# Patient Record
Sex: FEMALE | Race: WHITE
[De-identification: names, ages, dates, MRNs, and addresses within clinical notes are randomized per-mention and may not be internally consistent; named-entity substitution may affect disease eponyms.]

---

## 2022-08-10 ENCOUNTER — HOSPITAL ENCOUNTER (OUTPATIENT)
Dept: HOSPITAL 47 - RADMRIMAIN | Age: 52
Discharge: HOME | End: 2022-08-10
Attending: FAMILY MEDICINE
Payer: COMMERCIAL

## 2022-08-10 DIAGNOSIS — M47.816: Primary | ICD-10-CM

## 2022-08-10 DIAGNOSIS — M51.26: ICD-10-CM

## 2022-08-10 PROCEDURE — 72148 MRI LUMBAR SPINE W/O DYE: CPT

## 2022-08-11 NOTE — MR
EXAMINATION TYPE: MR lumbar spine wo con

 

DATE OF EXAM: 8/10/2022

 

COMPARISON: None

 

HISTORY: Low back pain that radiates down both legs

 

Multiplanar multi echo imaging of the lumbar spine with no contrast.

 

 

 

The lumbar vertebra have fairly normal alignment. There is mild narrowing of the disc spaces. Disc sp
ace narrowing more noticeable at L1-2. There is posterior mild disc herniation at L1-2 and L3-4. Ther
e is a minimal L5-S1 subluxation. No definite spondylolysis. No evidence of focal bone destruction. T
here is no lumbar paraspinal mass. No evidence of any significant lumbar spinal stenosis. The lumbar 
neural foramina appear fairly well maintained.

 

IMPRESSION:

Spondylotic changes. Mild posterior disc herniation at L1-2 and L3-4 without spinal stenosis. No frac
ture.

## 2022-11-15 ENCOUNTER — HOSPITAL ENCOUNTER (OUTPATIENT)
Dept: HOSPITAL 47 - LABPAT | Age: 52
Discharge: HOME | End: 2022-11-15
Attending: ORTHOPAEDIC SURGERY
Payer: COMMERCIAL

## 2022-11-15 DIAGNOSIS — Z01.812: ICD-10-CM

## 2022-11-15 DIAGNOSIS — Z01.818: Primary | ICD-10-CM

## 2022-11-15 DIAGNOSIS — M16.12: ICD-10-CM

## 2022-11-15 LAB
ALBUMIN SERPL-MCNC: 4.7 G/DL (ref 3.8–4.9)
ALBUMIN/GLOB SERPL: 1.69 G/DL (ref 1.6–3.17)
ALP SERPL-CCNC: 117 U/L (ref 41–126)
ALT SERPL-CCNC: 12 U/L (ref 8–44)
ANION GAP SERPL CALC-SCNC: 16.1 MMOL/L (ref 10–18)
APTT BLD: 26 SEC (ref 22–30)
AST SERPL-CCNC: 29 U/L (ref 13–35)
BASOPHILS # BLD AUTO: 0.08 X 10*3/UL (ref 0–0.1)
BASOPHILS NFR BLD AUTO: 0.8 %
BUN SERPL-SCNC: 11 MG/DL (ref 9–27)
BUN/CREAT SERPL: 14.38 RATIO (ref 12–20)
CALCIUM SPEC-MCNC: 10.1 MG/DL (ref 8.7–10.3)
CHLORIDE SERPL-SCNC: 105 MMOL/L (ref 96–109)
CO2 SERPL-SCNC: 22.3 MMOL/L (ref 20–27.5)
EOSINOPHIL # BLD AUTO: 0.23 X 10*3/UL (ref 0.04–0.35)
EOSINOPHIL NFR BLD AUTO: 2.4 %
ERYTHROCYTE [DISTWIDTH] IN BLOOD BY AUTOMATED COUNT: 5.79 X 10*6/UL (ref 4.1–5.2)
ERYTHROCYTE [DISTWIDTH] IN BLOOD: 16.8 % (ref 11.5–14.5)
GLOBULIN SER CALC-MCNC: 2.8 G/DL (ref 1.6–3.3)
GLUCOSE SERPL-MCNC: 75 MG/DL (ref 70–110)
HCT VFR BLD AUTO: 45.9 % (ref 37.2–46.3)
HGB BLD-MCNC: 13.6 G/DL (ref 12–15)
IMM GRANULOCYTES BLD QL AUTO: 0.3 %
INR PPP: 1.1 (ref ?–1.2)
LYMPHOCYTES # SPEC AUTO: 2.55 X 10*3/UL (ref 0.9–5)
LYMPHOCYTES NFR SPEC AUTO: 27 %
MCH RBC QN AUTO: 23.5 PG (ref 27–32)
MCHC RBC AUTO-ENTMCNC: 29.6 G/DL (ref 32–37)
MCV RBC AUTO: 79.3 FL (ref 80–97)
MONOCYTES # BLD AUTO: 0.57 X 10*3/UL (ref 0.2–1)
MONOCYTES NFR BLD AUTO: 6 %
NEUTROPHILS # BLD AUTO: 5.97 X 10*3/UL (ref 1.8–7.7)
NEUTROPHILS NFR BLD AUTO: 63.5 %
NRBC BLD AUTO-RTO: 0 /100 WBCS (ref 0–0)
PH UR: 7 [PH] (ref 5–8)
PLATELET # BLD AUTO: 332 X 10*3/UL (ref 140–440)
POTASSIUM SERPL-SCNC: 4.2 MMOL/L (ref 3.5–5.5)
PROT SERPL-MCNC: 7.5 G/DL (ref 6.2–8.2)
PT BLD: 11.4 SEC (ref 9–12)
RBC UR QL: 1 /HPF (ref 0–5)
SODIUM SERPL-SCNC: 144 MMOL/L (ref 135–145)
SP GR UR: 1.02 (ref 1–1.03)
SQUAMOUS UR QL AUTO: 4 /HPF (ref 0–4)
UROBILINOGEN UR QL STRIP: <2 MG/DL (ref ?–2)
WBC # BLD AUTO: 9.43 X 10*3/UL (ref 4.5–10)
WBC #/AREA URNS HPF: 23 /HPF (ref 0–5)

## 2022-11-15 PROCEDURE — 85610 PROTHROMBIN TIME: CPT

## 2022-11-15 PROCEDURE — 93005 ELECTROCARDIOGRAM TRACING: CPT

## 2022-11-15 PROCEDURE — 85730 THROMBOPLASTIN TIME PARTIAL: CPT

## 2022-11-15 PROCEDURE — 87070 CULTURE OTHR SPECIMN AEROBIC: CPT

## 2022-11-15 PROCEDURE — 80053 COMPREHEN METABOLIC PANEL: CPT

## 2022-11-15 PROCEDURE — 85025 COMPLETE CBC W/AUTO DIFF WBC: CPT

## 2022-11-15 PROCEDURE — 81001 URINALYSIS AUTO W/SCOPE: CPT

## 2022-11-21 ENCOUNTER — HOSPITAL ENCOUNTER (OUTPATIENT)
Dept: HOSPITAL 47 - OR | Age: 52
LOS: 1 days | Discharge: HOME HEALTH | End: 2022-11-22
Attending: ORTHOPAEDIC SURGERY
Payer: COMMERCIAL

## 2022-11-21 VITALS — BODY MASS INDEX: 40.7 KG/M2

## 2022-11-21 DIAGNOSIS — Z79.899: ICD-10-CM

## 2022-11-21 DIAGNOSIS — F17.210: ICD-10-CM

## 2022-11-21 DIAGNOSIS — M16.12: Primary | ICD-10-CM

## 2022-11-21 DIAGNOSIS — F41.8: ICD-10-CM

## 2022-11-21 DIAGNOSIS — E66.9: ICD-10-CM

## 2022-11-21 DIAGNOSIS — Z88.0: ICD-10-CM

## 2022-11-21 DIAGNOSIS — Z79.1: ICD-10-CM

## 2022-11-21 DIAGNOSIS — Z79.82: ICD-10-CM

## 2022-11-21 LAB — GLUCOSE BLD-MCNC: 106 MG/DL (ref 70–110)

## 2022-11-21 PROCEDURE — 85025 COMPLETE CBC W/AUTO DIFF WBC: CPT

## 2022-11-21 PROCEDURE — 86900 BLOOD TYPING SEROLOGIC ABO: CPT

## 2022-11-21 PROCEDURE — 86901 BLOOD TYPING SEROLOGIC RH(D): CPT

## 2022-11-21 PROCEDURE — 27130 TOTAL HIP ARTHROPLASTY: CPT

## 2022-11-21 PROCEDURE — 73501 X-RAY EXAM HIP UNI 1 VIEW: CPT

## 2022-11-21 PROCEDURE — 97161 PT EVAL LOW COMPLEX 20 MIN: CPT

## 2022-11-21 PROCEDURE — 88311 DECALCIFY TISSUE: CPT

## 2022-11-21 PROCEDURE — 86850 RBC ANTIBODY SCREEN: CPT

## 2022-11-21 PROCEDURE — 88305 TISSUE EXAM BY PATHOLOGIST: CPT

## 2022-11-21 RX ADMIN — CEFAZOLIN SCH: 330 INJECTION, POWDER, FOR SOLUTION INTRAMUSCULAR; INTRAVENOUS at 10:21

## 2022-11-21 RX ADMIN — HYDROCODONE BITARTRATE AND ACETAMINOPHEN PRN EACH: 7.5; 325 TABLET ORAL at 23:46

## 2022-11-21 RX ADMIN — POTASSIUM CHLORIDE SCH MLS: 14.9 INJECTION, SOLUTION INTRAVENOUS at 06:30

## 2022-11-21 RX ADMIN — HYDROCODONE BITARTRATE AND ACETAMINOPHEN PRN EACH: 7.5; 325 TABLET ORAL at 11:25

## 2022-11-21 RX ADMIN — ASPIRIN 325 MG ORAL TABLET SCH MG: 325 PILL ORAL at 20:27

## 2022-11-21 RX ADMIN — HYDROCODONE BITARTRATE AND ACETAMINOPHEN PRN EACH: 7.5; 325 TABLET ORAL at 16:55

## 2022-11-21 RX ADMIN — NICOTINE SCH PATCH: 21 PATCH, EXTENDED RELEASE TRANSDERMAL at 14:58

## 2022-11-21 NOTE — P.OP
Date of Procedure: 11/21/22


Preoperative Diagnosis: 


severe osteoarthritis left hip


Postoperative Diagnosis: 


1.  Severe osteoarthritis left hip


2.  Obesity


Procedure(s) Performed: 


1.  Left total hip arthroplasty with a direct anterior approach


2.  Application of Prevena wound vac sysytem 


Implants: 


[employer] Polarstem standard size 3 


[employer] R3, 3 hole hemispherical acetabular shell, 54 mm


[employer] Nephew Reflection 6.5 mm cancellus screw, 20 mm 2


[employer] R3, XLPE 20 acetabular liner 


[employer] Nephew Oxinium femoral head 36 m, +4


All components were press-fit.


The articulation is Oxinium on polyethylene.


Anesthesia: spinal


Surgeon: Sam Hatfield


Assistant #1: Carol Fonseca


Estimated Blood Loss (ml): 400


Pathology: other (femoral head)


Condition: stable


Disposition: PACU


Indications for Procedure: 


After failure of conservative treatment we discussed the surgical and 

nonsurgical treatment options at length.  Patient wishes to proceed with a total

hip arthroplasty with a direct anterior approach.  Complications specific to 

this procedure were discussed at length, including but not limited to infection,

leg length discrepancy, dislocation, nerve injury, and fracture.  Covid-19 was 

also discussed at length with the patient, and they are aware of the current 

policies and procedures.  The patient was given the option of delaying surgery, 

but they elect to proceed knowing these risks.  Patient is aware of all these 

complications and informed consent was obtained


Operative Findings: 


the operative findings are consistent with severe osteoarthritis of the left hip


Description of Procedure: 


Patient was seen and evaluated in the preoperative area and the consent was 

reviewed.  The operative site was marked with a skin marker.  The patient was 

then brought to the operating room and given preoperative antibiotics 

intravenously.  1 g of Tranexamic acid was also given intravenously.  A spinal 

anesthetic was administered by the anesthesia department.   The patient was then

placed on the Laclede table with the bony prominences well-padded.  The hip area 

was then prepped with a ChloraPrep solution and draped in the usual sterile 

fashion.





A universal timeout was then performed, which confirmed the patient's name, 

surgical site, ALLERGIES, and procedure being performed on the consent.  Next 

the incision site was located at 1 cm distal and 2 cm lateral to the anterior 

superior iliac spine.  The skin and subcutaneous tissues were sharply incised.  

Incision was carefully dissected down to the fascia overlying the tensor fascia 

lilia muscle.  This fascia was then incised in line with the incision.  Care was 

taken to stay laterally in order to avoid injuring the lateral femoral cutaneous

nerve.  Next, using blunt finger dissection, the tensor fascia lilia muscle was 

dissected off its investing fascia.  The muscle was then carefully retracted 

laterally with a cobra retractor over the lateral neck of the femur.  Next, the 

circumflex vessels were identified and cauterized using the AquaMantis device.  

The anterior hip capsule was then exposed.  The capsule was then opened and an 

inverted T fashion.  Cobra retractors were then placed intracapsularly.  The 

retractors were maintained intracapsular throughout the procedure.  The proximal

femur was then visualized.  Fluoroscopic x-rays were then taken in order to 

evaluate the preoperative leg lengths.  A small amount of traction was placed on

the leg.





The femoral neck was then osteotomized at the appropriate level above the lesser

trochanter.  A small wedge of bone was then removed from the remaining femoral 

head.  Next, using a corkscrew the femoral head was removed from the acetabulum.

 On gross visual inspection, the femoral head had complete loss of articular 

cartilage and multiple periarticular osteophytes.  The femoral head was then 

measured.  Attention was then turned to the acetabulum.





The acetabulum was exposed and any remaining labrum was excised.  Sequential 

reaming of the acetabulum was performed using fluoroscopic guidance until there 

was a good bed of bleeding cancellus bone.  When the appropriate size was 

reached, a trial was then placed.  The position and fit of the trial was checked

with fluoroscopy.  The trial was then removed.  Then, using fluoroscopic 

guidance, the final implant was impacted at 20 of anteversion and 40 of 

abduction, and fully seated in the acetabulum.  2 screws were then placed in the

acetabulum.  Again fluoroscopy was used to check position of the screws.  Next, 

the liner was then impacted, with a 20 elevated liner located in the anterior 

superior quadrant.  Component locking was confirmed.





Attention was then directed to the femur.  With the aid of the Laclede table, the 

femur was externally rotated to approximately 130, extended, and adducted under

the opposite leg.  A side hook was then placed under the proximal femur, and the

side hook elevator was used to elevate the proximal femur while releasing the 

capsule.  Retractors were then placed.  A capsular release was performed, as 

well as a release of the conjoined tendon, which afforded excellent 

visualization of the proximal femur.  Next, a box osteotome was used to 

lateralize the proximal femur.  A  was then used to locate the 

femoral canal.  Sequential broaching was then performed with appropriate size 

which afforded excellent fixation in the proximal femur.  A trial was then 

placed with appropriate head and neck, and the hip was gently reduced with the 

aid of the Laclede table.  Fluoroscopy was then used to check position of the 

components, as well as to evaluate the leg lengths and offset.  The leg lengths 

and offset were measured as closely as possible to ensure stability of the hip. 

The hip was then gently dislocated and the trials were then removed.  Final 

implants were then impacted and the hip was again reduced.  Final fluoroscopic 

x-rays confirmed that the components were in anatomic position.  The leg lengths

and offset were measured and were found to coincide with the trial measurements.

 The hip was also taken through range of motion, and found to be stable.





The hip was then copiously irrigated with antibiotic solution with pulsatile 

lavage.  The hip was then irrigated with Irrisept solution.  The soft tissues 

were then injected with a ropivacaine solution.  A second dose of 1 g of 

Tranexamic acid was also given intravenously. 





The fascia was then closed with 2-0 strata fix suture.  The subcutaneous tissue 

was closed with 3-0 Vicryl.  The subcuticular tissue was closed with 3-0 strata 

fix suture.  A Prevena wound vac dressing was applied secondary to the patients 

large panus covering the incision site.  The patient was then transferred to the

recovery room in stable condition.





The assistant  CHELLY Woodard was required due to the complexity of surgery, 

and the need for skilled surgical assistant for positioning, draping, exposure, 

retraction, and closure of the wound.

## 2022-11-21 NOTE — FL
Intraoperative/procedural fluoroscopic services were provided. Total fluoroscopy time is 43 seconds w
ith a total of 3 submitted images to PACS. Please see the operative/procedural note for further detai
ls.

## 2022-11-21 NOTE — P.CONS
History of Present Illness





- Reason for Consult


Consult date: 11/21/22


Medical management


Requesting physician: Sam Hatfield





- Chief Complaint


Left hip surgery





- History of Present Illness





This is a pleasant 52-year-old patient who follows with Dr. Narayan Wade.  

Chronic stable medical conditions include GERD, anxiety, depression, nicotine 

dependence, osteoarthritis.  Patient has undergone left total hip arthroplasty. 

Postprocedure pain is controlled.  No nausea vomiting.  Did tolerate some diet. 

No chest pain or shortness of breath.





Review of systems:


GEN.:  Tired


EYES: None


HEENT: None


NECK: None


RESPIRATORY: None


CARDIOVASCULAR: None


GASTROINTESTINAL: None


GENITOURINARY: None


MUSCULOSKELETAL: Joint pains


LYMPHATICS: None


HEMATOLOGICAL: None  


PSYCHIATRY: None


NEUROLOGICAL: None





Past medical history to include:


GERD, anxiety, depression, nicotine dependence





Social history:


Lives with her .  Smokes about half a pack a day for last 34 years.  No 

alcohol.





Family history:


Reviewed, noncontributory to presentation





Physical examination:


VITAL SIGNS: 97.7, 92, 17, 1 25 x 65, 97% room air


GENERAL: BMI 40.8, declining bed, awake, comfortable.


EYES: Pupils equal.  Conjunctiva normal.


HEENT: External appearance of nose and ears normal, oral cavity grossly normal.


NECK: JVD not raised; masses not palpable.


HEART: First and second heart sounds are normal;  no edema.  


LUNGS: Respiratory rate normal; clear to auscultation.  


ABDOMEN: Soft,  nontender, liver spleen not palpable, no masses palpable.  


PSYCH: Alert and oriented x3;  mood  and affect normal.  


MUSCULOSKELETAL:No Clubbing/cyanosis;muscles-grossly intact, evidence of OA, 

dressing on the left hip


NEUROLOGICAL: Cranial nerves grossly intact; no facial asymmetry,   power and 

sensation grossly intact. 


LYMPHATICS: No lymph nodes palpable in the axilla and neck





INVESTIGATIONS, reviewed in the clinical context:


Lab work from 11/15/2022


WBC 9.4 hemoglobin 13.6 platelets 332 potassium 4.2 creatinine 0.8 AST 29 ALT 12





Assessment and plan:





-Left total hip arthroplasty.


Pain control.  Aspirin for DVT prophylaxis.  Ancef for infection prophylaxis





-Morbid obesity BMI 40.8


Consult dietitian for weight loss measures





-Depression and anxiety


Lexapro





-Chronic nicotine dependence, suggested smoker


Nicotine patch





Care was discussed with the patient.  Questions answered.  Nicotine patch.  

Aspirin for DVT prophylaxis.  Patient to follow-up with his PCP upon discharge


Thank you Dr. Hatfield





Past Medical History


Past Medical History: Osteoarthritis (OA)


Additional Past Medical History / Comment(s): US on 11-14-22 @ [facility] of left leg no blood clots after having injury-no open 

areas,periodic steroid use-last used Nov 2022, Ant TLH 11/21/2022


History of Any Multi-Drug Resistant Organisms: None Reported


Additional Past Surgical History / Comment(s): wisdom teeth


Past Anesthesia/Blood Transfusion Reactions: Motion Sickness, Postoperative 

Nausea & Vomiting (PONV)


Past Psychological History: Anxiety, Depression


Smoking Status: Current every day smoker


Past Alcohol Use History: None Reported


Additional Past Alcohol Use History / Comment(s): started smoking at age 

18,<1ppd


Past Drug Use History: None Reported





- Past Family History


  ** Mother


Family Medical History: No Reported History





Medications and Allergies


                                Home Medications











 Medication  Instructions  Recorded  Confirmed  Type


 


ALPRAZolam [Xanax] 0.25 mg PO BID PRN 11/16/22 11/16/22 History


 


Escitalopram Oxalate [Lexapro] 10 mg PO HS 11/16/22 11/16/22 History


 


HYDROcodone/APAP 5-325MG [Norco 1 tab PO Q6HR PRN 11/16/22 11/16/22 History





5-325]    


 


Ibuprofen [Motrin] 800 mg PO Q8H PRN 11/16/22 11/16/22 History


 


methylPREDNISolone Dose Pack 4 mg PO AS DIRECTED PRN 11/16/22 11/16/22 History





[Medrol Dose Pack]    


 


Aspirin 325 mg PO BID #60 tab 11/21/22  Rx


 


HYDROcodone/APAP 7.5-325MG [Norco 1 - 2 tab PO Q6H PRN #32 tab 11/21/22  Rx





7.5-325]    


 


Sennosides [Senokot] 2 tab PO DAILY PRN #60 tablet 11/21/22  Rx








                                    Allergies











Allergy/AdvReac Type Severity Reaction Status Date / Time


 


Penicillins Allergy  Rash/Hives Verified 11/16/22 15:08














Physical Exam


Vitals: 


                                   Vital Signs











  Temp Pulse Resp BP Pulse Ox


 


 11/21/22 20:00  97.7 F  92  17  125/65  97


 


 11/21/22 14:00  98.2 F  105 H  18  106/70  94 L


 


 11/21/22 11:15  98.0 F  70  18  104/66  96


 


 11/21/22 10:47   62  16  107/56  95


 


 11/21/22 10:32   64  16  111/61  94 L


 


 11/21/22 10:17   69  16  109/62  95


 


 11/21/22 10:02   64  16  109/60  95


 


 11/21/22 09:47   67  16  116/62  94 L


 


 11/21/22 09:32   63  16  110/58  94 L


 


 11/21/22 09:17   68  16  105/58  96


 


 11/21/22 09:02   68  16  106/62  96


 


 11/21/22 08:47  97.0 F L  78  12  113/63  99


 


 11/21/22 06:46  97 F L  69  20  132/72  98








                                Intake and Output











 11/21/22 11/21/22 11/21/22





 06:59 14:59 22:59


 


Intake Total 100 1251 


 


Output Total  600 


 


Balance 100 651 


 


Intake:   


 


   1251 


 


Output:   


 


  Urine  200 


 


  Estimated Blood Loss  400 


 


Other:   


 


  Weight 101.1 kg 101.1 kg

## 2022-11-21 NOTE — XR
EXAMINATION TYPE: XR Hip Limited LT

 

DATE OF EXAM: 11/21/2022

 

COMPARISON: NONE

 

HISTORY: Postop

 

TECHNIQUE: One view submitted.

 

FINDINGS:

There is postsurgical change in near anatomic alignment.  There is soft tissue edema and emphysema. 

 

IMPRESSION:

1. Postoperative change.  Appears in near-anatomic alignment.

## 2022-11-22 VITALS
SYSTOLIC BLOOD PRESSURE: 145 MMHG | HEART RATE: 64 BPM | RESPIRATION RATE: 18 BRPM | DIASTOLIC BLOOD PRESSURE: 75 MMHG | TEMPERATURE: 97.9 F

## 2022-11-22 LAB
BASOPHILS # BLD AUTO: 0.04 X 10*3/UL (ref 0–0.1)
BASOPHILS NFR BLD AUTO: 0.4 %
EOSINOPHIL # BLD AUTO: 0.09 X 10*3/UL (ref 0.04–0.35)
EOSINOPHIL NFR BLD AUTO: 0.8 %
ERYTHROCYTE [DISTWIDTH] IN BLOOD BY AUTOMATED COUNT: 3.9 X 10*6/UL (ref 4.1–5.2)
ERYTHROCYTE [DISTWIDTH] IN BLOOD: 17.1 % (ref 11.5–14.5)
HCT VFR BLD AUTO: 30.7 % (ref 37.2–46.3)
HGB BLD-MCNC: 9 G/DL (ref 12–15)
IMM GRANULOCYTES BLD QL AUTO: 0.4 %
LYMPHOCYTES # SPEC AUTO: 1.86 X 10*3/UL (ref 0.9–5)
LYMPHOCYTES NFR SPEC AUTO: 17.5 %
MCH RBC QN AUTO: 23.1 PG (ref 27–32)
MCHC RBC AUTO-ENTMCNC: 29.3 G/DL (ref 32–37)
MCV RBC AUTO: 78.7 FL (ref 80–97)
MONOCYTES # BLD AUTO: 1.05 X 10*3/UL (ref 0.2–1)
MONOCYTES NFR BLD AUTO: 9.9 %
NEUTROPHILS # BLD AUTO: 7.55 X 10*3/UL (ref 1.8–7.7)
NEUTROPHILS NFR BLD AUTO: 71 %
NRBC BLD AUTO-RTO: 0 /100 WBCS (ref 0–0)
PLATELET # BLD AUTO: 228 X 10*3/UL (ref 140–440)
WBC # BLD AUTO: 10.63 X 10*3/UL (ref 4.5–10)

## 2022-11-22 RX ADMIN — HYDROCODONE BITARTRATE AND ACETAMINOPHEN PRN EACH: 7.5; 325 TABLET ORAL at 06:59

## 2022-11-22 RX ADMIN — CEFAZOLIN SCH: 330 INJECTION, POWDER, FOR SOLUTION INTRAMUSCULAR; INTRAVENOUS at 04:26

## 2022-11-22 RX ADMIN — ASPIRIN 325 MG ORAL TABLET SCH MG: 325 PILL ORAL at 07:35

## 2022-11-22 RX ADMIN — HYDROCODONE BITARTRATE AND ACETAMINOPHEN PRN EACH: 7.5; 325 TABLET ORAL at 12:05

## 2022-11-22 RX ADMIN — NICOTINE SCH PATCH: 21 PATCH, EXTENDED RELEASE TRANSDERMAL at 07:35

## 2022-11-22 RX ADMIN — POTASSIUM CHLORIDE SCH: 14.9 INJECTION, SOLUTION INTRAVENOUS at 06:09

## 2022-11-22 NOTE — P.PN
Progress Note - Text


Progress Note Date: 11/22/22





- Chief Complaint


Left hip surgery








Hospital course


This is a pleasant 52-year-old patient who follows with Dr. Narayan Wade.  

Chronic stable medical conditions include GERD, anxiety, depression, nicotine 

dependence, osteoarthritis.  Patient has undergone left total hip arthroplasty. 

Postprocedure pain is controlled.  No nausea vomiting.  Did tolerate some diet. 

No chest pain or shortness of breath.


11/22/2022: Pain control.  Oral intake fair.  Up in a chair.  Did ambulate.  

Questions answered.





Current medications reviewed





Past medical history to include:


GERD, anxiety, depression, nicotine dependence





Social history:


Lives with her .  Smokes about half a pack a day for last 34 years.  No 

alcohol.





Family history:


Reviewed, noncontributory to presentation





Physical examination:


VITAL SIGNS: 97.9, 64, 18, 145% he 5, 99% room air


GENERAL: Up in a chair, comfortable


EYES: Pupils equal.  Conjunctiva normal.


HEENT: External appearance of nose and ears normal, oral cavity grossly normal.


NECK: JVD not raised; masses not palpable.


HEART: First and second heart sounds are normal;  no edema.  


LUNGS: Respiratory rate normal; clear to auscultation.  


ABDOMEN: Soft,  nontender, liver spleen not palpable, no masses palpable.  


PSYCH: Alert and oriented x3;  mood  and affect normal.  


MUSCULOSKELETAL:No Clubbing/cyanosis;muscles-grossly intact, evidence of OA, 

dressing on the left hip








INVESTIGATIONS, reviewed in the clinical context:


11/22/2022: White count 10.6 hemoglobin 9 platelets 228


Lab work from 11/15/2022


WBC 9.4 hemoglobin 13.6 platelets 332 potassium 4.2 creatinine 0.8 AST 29 ALT 12





Assessment and plan:





-Left total hip arthroplasty.


Pain control.  Aspirin for DVT prophylaxis.  Ancef for infection prophylaxis





-Acute postprocedure blood loss anemia expected from surgery


Add ferrous sulfate





-Morbid obesity BMI 40.8


Consult dietitian for weight loss measures





-Depression and anxiety


Lexapro





-Chronic nicotine dependence, suggested smoker


Nicotine patch





Ferrous sulfate added.  Patient to follow-up with PCP upon discharge.  

Discussed.


Thank you Dr. Hatfield

## 2023-03-21 ENCOUNTER — HOSPITAL ENCOUNTER (OUTPATIENT)
Dept: HOSPITAL 47 - OR | Age: 53
Setting detail: OBSERVATION
LOS: 2 days | Discharge: HOME HEALTH | End: 2023-03-23
Attending: ORTHOPAEDIC SURGERY | Admitting: ORTHOPAEDIC SURGERY
Payer: COMMERCIAL

## 2023-03-21 VITALS — BODY MASS INDEX: 40.2 KG/M2

## 2023-03-21 DIAGNOSIS — E66.9: ICD-10-CM

## 2023-03-21 DIAGNOSIS — Z82.49: ICD-10-CM

## 2023-03-21 DIAGNOSIS — M16.11: Primary | ICD-10-CM

## 2023-03-21 DIAGNOSIS — G47.00: ICD-10-CM

## 2023-03-21 DIAGNOSIS — F32.A: ICD-10-CM

## 2023-03-21 DIAGNOSIS — Z79.891: ICD-10-CM

## 2023-03-21 DIAGNOSIS — Z96.642: ICD-10-CM

## 2023-03-21 DIAGNOSIS — Z79.1: ICD-10-CM

## 2023-03-21 DIAGNOSIS — Z88.0: ICD-10-CM

## 2023-03-21 DIAGNOSIS — Z87.891: ICD-10-CM

## 2023-03-21 DIAGNOSIS — Z79.899: ICD-10-CM

## 2023-03-21 DIAGNOSIS — Z83.3: ICD-10-CM

## 2023-03-21 DIAGNOSIS — F41.9: ICD-10-CM

## 2023-03-21 PROCEDURE — 86900 BLOOD TYPING SEROLOGIC ABO: CPT

## 2023-03-21 PROCEDURE — 86850 RBC ANTIBODY SCREEN: CPT

## 2023-03-21 PROCEDURE — 86901 BLOOD TYPING SEROLOGIC RH(D): CPT

## 2023-03-21 PROCEDURE — 85025 COMPLETE CBC W/AUTO DIFF WBC: CPT

## 2023-03-21 PROCEDURE — 97166 OT EVAL MOD COMPLEX 45 MIN: CPT

## 2023-03-21 PROCEDURE — 27130 TOTAL HIP ARTHROPLASTY: CPT

## 2023-03-21 PROCEDURE — 97116 GAIT TRAINING THERAPY: CPT

## 2023-03-21 PROCEDURE — 88300 SURGICAL PATH GROSS: CPT

## 2023-03-21 PROCEDURE — 81025 URINE PREGNANCY TEST: CPT

## 2023-03-21 PROCEDURE — 73501 X-RAY EXAM HIP UNI 1 VIEW: CPT

## 2023-03-21 PROCEDURE — 97161 PT EVAL LOW COMPLEX 20 MIN: CPT

## 2023-03-21 PROCEDURE — 97530 THERAPEUTIC ACTIVITIES: CPT

## 2023-03-21 RX ADMIN — DOCUSATE SODIUM AND SENNOSIDES SCH EACH: 50; 8.6 TABLET ORAL at 20:06

## 2023-03-21 RX ADMIN — ASPIRIN 325 MG ORAL TABLET SCH MG: 325 PILL ORAL at 20:06

## 2023-03-21 RX ADMIN — POTASSIUM CHLORIDE SCH MLS: 14.9 INJECTION, SOLUTION INTRAVENOUS at 05:50

## 2023-03-21 RX ADMIN — HYDROMORPHONE HYDROCHLORIDE PRN MG: 1 INJECTION, SOLUTION INTRAMUSCULAR; INTRAVENOUS; SUBCUTANEOUS at 09:43

## 2023-03-21 RX ADMIN — HYDROMORPHONE HYDROCHLORIDE PRN MG: 1 INJECTION, SOLUTION INTRAMUSCULAR; INTRAVENOUS; SUBCUTANEOUS at 23:30

## 2023-03-21 RX ADMIN — HYDROMORPHONE HYDROCHLORIDE PRN MG: 1 INJECTION, SOLUTION INTRAMUSCULAR; INTRAVENOUS; SUBCUTANEOUS at 12:22

## 2023-03-21 RX ADMIN — POTASSIUM CHLORIDE SCH: 14.9 INJECTION, SOLUTION INTRAVENOUS at 21:36

## 2023-03-21 RX ADMIN — CEFAZOLIN SCH MLS/HR: 330 INJECTION, POWDER, FOR SOLUTION INTRAMUSCULAR; INTRAVENOUS at 23:26

## 2023-03-21 RX ADMIN — HYDROMORPHONE HYDROCHLORIDE PRN MG: 1 INJECTION, SOLUTION INTRAMUSCULAR; INTRAVENOUS; SUBCUTANEOUS at 10:03

## 2023-03-21 RX ADMIN — HYDROMORPHONE HYDROCHLORIDE PRN MG: 1 INJECTION, SOLUTION INTRAMUSCULAR; INTRAVENOUS; SUBCUTANEOUS at 20:04

## 2023-03-21 RX ADMIN — CEFAZOLIN SCH: 330 INJECTION, POWDER, FOR SOLUTION INTRAMUSCULAR; INTRAVENOUS at 13:38

## 2023-03-21 NOTE — XR
EXAMINATION TYPE: XR Hip Limited RT

 

DATE OF EXAM: 3/21/2023 10:23 AM

 

INDICATION: 

Patient age:Female;  52 years old; 

Reason for study: Status post hip surgery, assess surgical alignment; PHH. 

 

COMPARISON: Fluoroscopic images of the right hip from the same date.

 

TECHNIQUE: The right hip was examined in frontal view.

 

FINDINGS: Postsurgical changes from right total hip arthroplasty. Hardware appears intact with approp
riate alignment. There is associated subcutaneous gas and edema. No acute fracture or dislocation.

 

IMPRESSION: 

Postsurgical changes from right total hip arthroplasty. Hardware appears intact with appropriate alig
nment.

## 2023-03-21 NOTE — P.OP
Date of Procedure: 03/21/23


Preoperative Diagnosis: 


Severe osteoarthritis right hip


Postoperative Diagnosis: 


Severe osteoarthritis right hip


Procedure(s) Performed: 


Right total hip arthroplasty with a direct anterior approach


Implants: 


Smith & Nephew Polarstem standard size 4


Smith & Nephew R3, 3 hole hemispherical acetabular shell, 50 mm


Smith & Nephew Reflection 6.5 mm cancellus screw, 20 mm, 25 mm


Smith & Nephew R3, XLPE 20 acetabular liner 


Smith & Nephew Oxinium femoral head 36 m, +0


All components were press-fit.


The articulation is Oxinium on polyethylene.


Anesthesia: spinal


Surgeon: Sam Hatfield


Assistant #1: Carol Fonseca


Estimated Blood Loss (ml): 700


Pathology: other (Femoral head)


Condition: stable


Disposition: PACU


Indications for Procedure: 


After failure of conservative treatment we discussed the surgical and nonsu

rgical treatment options at length.  Patient wishes to proceed with a total hip 

arthroplasty with a direct anterior approach.  Complications specific to this 

procedure were discussed at length, including but not limited to infection, leg 

length discrepancy, dislocation, nerve injury, and fracture.  Covid-19 was also 

discussed at length with the patient, and they are aware of the current policies

and procedures.  The patient was given the option of delaying surgery, but they 

elect to proceed knowing these risks.  Patient is aware of all these 

complications and informed consent was obtained


Operative Findings: 


The operative findings are consistent with severe osteoarthritis of the right 

hip


Description of Procedure: 


The patient was seen and evaluated in the preoperative area and the consent was 

reviewed.  The operative site was marked with a skin marker.  The patient 

verified the procedure and operative site.  A PENG block was placed by 

anesthesia in the preoperative area. The patient was then brought to the 

operating room and given preoperative antibiotics intravenously.  1 g of 

Tranexamic acid was also given intravenously.  A spinal anesthetic was 

administered by the anesthesia department.   The patient was then placed on the 

Renwick table with the bony prominences well-padded.  The hip area was then prepped

with a ChloraPrep solution and draped in the usual sterile fashion.





A universal timeout was then performed, which confirmed the patient's name, 

surgical site, ALLERGIES, and procedure being performed on the consent.  Next 

the incision site was located at 1 cm distal and 4 cm lateral to the anterior 

superior iliac spine.  The skin and subcutaneous tissues were sharply incised.  

Incision was carefully dissected down to the fascia overlying the tensor fascia 

lilia muscle.  This fascia was then incised in line with the muscle fibers.  Care

was taken to stay laterally in order to avoid injuring the lateral femoral 

cutaneous nerve.  Next, using blunt finger dissection, the tensor fascia lilia 

muscle was dissected off its investing fascia.  The muscle was then carefully 

retracted laterally with a cobra retractor over the lateral neck of the femur.  

Next, the circumflex vessels were identified and cauterized using the Aquamantis

device.  The anterior hip capsule was then exposed.  The capsule was then opened

and an inverted T fashion.  The retractors were then placed intracapsularly.  

The retractors were maintained intracapsular throughout the procedure.  The 

proximal femur was then visualized.  Fluoroscopic x-rays were then taken in 

order to evaluate the preoperative leg lengths.  A small amount of traction was 

placed on the leg.





The femoral neck was then osteotomized at the appropriate level above the lesser

trochanter.  A small wedge of bone was then removed from the remaining femoral 

head.  Next, using a corkscrew the femoral head was removed from the acetabulum.

 On gross visual inspection, the femoral head had complete loss of articular 

cartilage and multiple periarticular osteophytes.  The femoral head was then 

measured.  Attention was then turned to the acetabulum.





The acetabulum was exposed and any remaining labrum was excised.  Sequential 

reaming of the acetabulum was performed using fluoroscopic guidance until there 

was a good bed of bleeding cancellus bone.  When the appropriate size was 

reached, a trial was then placed.  The position and fit of the trial was checked

with fluoroscopy.  The trial was then removed.  Then, using fluoroscopic 

guidance, the final implant was impacted at 20 of anteversion and 40 of 

abduction, and fully seated in the acetabulum.  2 screws were then placed in the

acetabulum.  Again fluoroscopy was used to check position of the screws.  Next, 

the liner was then impacted, with a 20 elevated liner located in the anterior 

superior quadrant.  Component locking was confirmed.





Attention was then directed to the femur.  With the aid of the Renwick table, the 

femur was externally rotated to approximately 130, extended, and adducted under

the opposite leg.  A side hook was then placed under the proximal femur, and the

side hook elevator was used to elevate the proximal femur while releasing the 

capsule.  Retractors were then placed.  A capsular release was performed, as 

well as a release of the conjoined tendon, which afforded excellent v

isualization of the proximal femur.  Next, a box osteotome was used to 

lateralize the proximal femur.  A  was then used to locate the 

femoral canal.  Sequential broaching was then performed with appropriate size 

which afforded excellent fixation in the proximal femur.  A trial was then 

placed with appropriate head and neck, and the hip was gently reduced with the 

aid of the Renwick table.  Fluoroscopy was then used to check position of the 

components, as well as to evaluate the leg lengths and offset.  The leg lengths 

and offset were measured as closely as possible to ensure stability of the hip. 

The hip was then gently dislocated and the trials were then removed.  Final 

implants were then impacted and the hip was again reduced.  Final fluoroscopic 

x-rays confirmed that the components were in anatomic position.  The leg lengths

and offset were measured and were found to coincide with the trial measurements.

 The hip was also taken through range of motion, and found to be stable.





The hip was then copiously irrigated with antibiotic solution with pulsatile 

lavage.  The hip was then irrigated with Irrisept solution.  The soft tissues 

were then injected with a ropivacaine solution.  A second dose of 1 g of 

Tranexamic acid was also given intravenously. 





The fascia was then closed with 2-0 strata fix suture.  The subcutaneous tissue 

was closed with 3-0 Vicryl.  The subcuticular tissue was closed with 3-0 strata 

fix suture.  The skin was then closed with Exofin skin glue.  After the glue and

dried, and Optifoam silver impregnated dressing was applied.  The patient was t

hen transferred to the recovery room in stable condition.





The assistant  CHELLY Woodard was required due to the complexity of surgery, 

and the need for skilled surgical assistant for positioning, draping, exposure, 

retraction, and closure of the wound.

## 2023-03-21 NOTE — P.CONS
History of Present Illness





- Reason for Consult


Consult date: 03/21/23


Medical management


Requesting physician: Sam Hatfield





- Chief Complaint


Right hip surgery





- History of Present Illness





This is a pleasant 52-year-old patient who follows with Dr. Narayan Wade.  

Chronic stable medical conditions include osteoarthritis, anxiety, depression, 

insomnia, obesity.


Patient is undergoing right total hip arthroplasty.  Most procedure pain is con

trolled.  No nausea vomiting.  Eating lunch.  No chest or shortness of breath.  

Denies any cardiac history.  Resting in bed.  Patient's son and daughter the 

bedside.





Review of systems:


GEN.: None


EYES: None


HEENT: None


NECK: None


RESPIRATORY: None


CARDIOVASCULAR: None


GASTROINTESTINAL: None


GENITOURINARY: None


MUSCULOSKELETAL: Joint pains]


LYMPHATICS: None


HEMATOLOGICAL: None  


PSYCHIATRY: None


NEUROLOGICAL: Insomnia





Past medical history to include:


Osteoarthritis, anxiety, depression, obesity insomnia





Social history:


.  Does not smoke or drink alcohol





Physical examination:


VITAL SIGNS: Afebrile, 77, 16, 100/66, 99%


GENERAL: BMI 40.5, declining a bit of a comfortable.


EYES: Pupils equal.  Conjunctiva normal.


HEENT: External appearance of nose and ears normal, oral cavity grossly normal.


NECK: JVD not raised; masses not palpable.


HEART: First and second heart sounds are normal;  no edema.  


LUNGS: Respiratory rate normal; clear to auscultation.  


ABDOMEN: Soft,  nontender, liver spleen not palpable, no masses palpable.  


PSYCH: Alert and oriented x3;  mood  and affect normal.  


MUSCULOSKELETAL:No Clubbing/cyanosis;muscles-grossly intact.  OA.  Dressing over

the right hip


NEUROLOGICAL: Cranial nerves grossly intact; no facial asymmetry,   power and 

sensation grossly intact. 


LYMPHATICS: No lymph nodes palpable in the axilla and neck





INVESTIGATIONS, reviewed in the clinical context:


03/19/2023:


White count 6.6 hemoglobin 11.7 platelets 179 potassium 4.1 creatinine 0.6





Assessment and plan:





-Right total hip arthroplasty


Pain control.  Aspirin for DT prophylaxis.





-Morbid Obesity BMI 40.5


Weight loss measures





-Primary osteoarthritis


Pain medications as needed





-Anxiety not otherwise specified


Xanax 0.25 mg twice a day when necessary





-Depression


Lexapro 10 mg a day





Care was discussed with the patient and family by the bedside.  Questions 

answered.





Thank you Dr. Hatfield








Past Medical History


Past Medical History: Osteoarthritis (OA)


Additional Past Medical History / Comment(s[facility] no blood clots after having injury-no open 

areas,periodic steroid use-last used Nov 2022, Ant TLH 11/21/2022


History of Any Multi-Drug Resistant Organisms: None Reported


Past Surgical History: Joint Replacement


Additional Past Surgical History / Comment(s): LT NORY 11/21/22, RT NORY 3/21/2022


Past Anesthesia/Blood Transfusion Reactions: Postoperative Nausea & Vomiting 

(PONV)


Past Psychological History: Anxiety, Depression


Smoking Status: Former smoker


Past Alcohol Use History: Occasional


Additional Past Alcohol Use History / Comment(s): QUIT SMOKING 11/2022


Past Drug Use History: None Reported





- Past Family History


  ** Mother


Family Medical History: No Reported History





Medications and Allergies


                                Home Medications











 Medication  Instructions  Recorded  Confirmed  Type


 


ALPRAZolam [Xanax] 0.25 mg PO BID PRN 11/16/22 03/21/23 History


 


Escitalopram Oxalate [Lexapro] 10 mg PO DAILY 11/16/22 03/21/23 History


 


Aspirin 325 mg PO BID #60 tab 03/21/23  Rx


 


HYDROcodone/APAP 7.5-325MG [Norco 1 - 2 tab PO Q6H PRN #32 tab 03/21/23  Rx





7.5-325]    


 


HYDROcodone/APAP 7.5-325MG [Norco 1 tab PO Q8H PRN 03/21/23 03/21/23 History





7.5-325]    


 


Sennosides [Senokot] 2 tab PO DAILY PRN #60 tablet 03/21/23  Rx








                                    Allergies











Allergy/AdvReac Type Severity Reaction Status Date / Time


 


Penicillins Allergy  Rash/Hives Verified 03/14/23 15:37














Physical Exam


Vitals: 


                                   Vital Signs











  Temp Pulse Resp BP Pulse Ox


 


 03/21/23 13:22   77   100/66 


 


 03/21/23 13:07   76   98/64 


 


 03/21/23 12:53   76   96/62 


 


 03/21/23 12:38   66   100/57 


 


 03/21/23 12:21   63   106/70 


 


 03/21/23 12:07   59 L   97/62 


 


 03/21/23 11:53   57 L   82/62 


 


 03/21/23 11:23  97.6 F  68  18  87/57 


 


 03/21/23 10:47   60  18  119/56  92 L


 


 03/21/23 10:32   59 L  18  99/52  92 L


 


 03/21/23 10:18   59 L  16  99/52  93 L


 


 03/21/23 09:47   54 L  16  93/54  93 L


 


 03/21/23 09:32   54 L  16  92/51  94 L


 


 03/21/23 09:17   55 L  16  92/55  97


 


 03/21/23 09:02   54 L  14  80/45  98


 


 03/21/23 08:47   60  16  78/42  97


 


 03/21/23 08:32  97 F L  89  16  96/53  100


 


 03/21/23 06:07  97.9 F  61  18  132/65  95








                                Intake and Output











 03/20/23 03/21/23 03/21/23





 22:59 06:59 14:59


 


Intake Total  851 750


 


Output Total   700


 


Balance  851 50


 


Intake:   


 


  IV  851 750


 


Output:   


 


  Estimated Blood Loss   700


 


Other:   


 


  Weight  100.5 kg 100.5 kg

## 2023-03-21 NOTE — XR
Intraoperative/procedural fluoroscopic services were provided for right total hip arthroplasty. Total
 fluoroscopy time is 28 seconds with a total of 3 submitted images to PACS. Total DAP 1.6589. Please 
see the operative note for further details.

## 2023-03-22 LAB
BASOPHILS # BLD AUTO: 0.04 X 10*3/UL (ref 0–0.1)
BASOPHILS NFR BLD AUTO: 0.4 %
EOSINOPHIL # BLD AUTO: 0.03 X 10*3/UL (ref 0.04–0.35)
EOSINOPHIL NFR BLD AUTO: 0.3 %
ERYTHROCYTE [DISTWIDTH] IN BLOOD BY AUTOMATED COUNT: 3.76 X 10*6/UL (ref 4.1–5.2)
ERYTHROCYTE [DISTWIDTH] IN BLOOD: 18.9 % (ref 11.5–14.5)
HCT VFR BLD AUTO: 29 % (ref 37.2–46.3)
HGB BLD-MCNC: 8.3 G/DL (ref 12–15)
IMM GRANULOCYTES BLD QL AUTO: 0.4 %
LYMPHOCYTES # SPEC AUTO: 2.11 X 10*3/UL (ref 0.9–5)
LYMPHOCYTES NFR SPEC AUTO: 19.9 %
MCH RBC QN AUTO: 22.1 PG (ref 27–32)
MCHC RBC AUTO-ENTMCNC: 28.6 G/DL (ref 32–37)
MCV RBC AUTO: 77.1 FL (ref 80–97)
MONOCYTES # BLD AUTO: 1.1 X 10*3/UL (ref 0.2–1)
MONOCYTES NFR BLD AUTO: 10.4 %
NEUTROPHILS # BLD AUTO: 7.26 X 10*3/UL (ref 1.8–7.7)
NEUTROPHILS NFR BLD AUTO: 68.6 %
NRBC BLD AUTO-RTO: 0 /100 WBCS (ref 0–0)
PLATELET # BLD AUTO: 238 X 10*3/UL (ref 140–440)
WBC # BLD AUTO: 10.58 X 10*3/UL (ref 4.5–10)

## 2023-03-22 RX ADMIN — SODIUM FERRIC GLUCONATE COMPLEX SCH MLS/HR: 12.5 INJECTION INTRAVENOUS at 17:05

## 2023-03-22 RX ADMIN — HYDROMORPHONE HYDROCHLORIDE PRN MG: 1 INJECTION, SOLUTION INTRAMUSCULAR; INTRAVENOUS; SUBCUTANEOUS at 09:43

## 2023-03-22 RX ADMIN — CEFAZOLIN SCH: 330 INJECTION, POWDER, FOR SOLUTION INTRAMUSCULAR; INTRAVENOUS at 17:12

## 2023-03-22 RX ADMIN — POTASSIUM CHLORIDE SCH: 14.9 INJECTION, SOLUTION INTRAVENOUS at 20:27

## 2023-03-22 RX ADMIN — HYDROCODONE BITARTRATE AND ACETAMINOPHEN PRN EACH: 7.5; 325 TABLET ORAL at 06:08

## 2023-03-22 RX ADMIN — HYDROCODONE BITARTRATE AND ACETAMINOPHEN PRN EACH: 7.5; 325 TABLET ORAL at 13:03

## 2023-03-22 RX ADMIN — ESCITALOPRAM OXALATE SCH MG: 10 TABLET, FILM COATED ORAL at 08:05

## 2023-03-22 RX ADMIN — DOCUSATE SODIUM AND SENNOSIDES SCH EACH: 50; 8.6 TABLET ORAL at 20:27

## 2023-03-22 RX ADMIN — HYDROMORPHONE HYDROCHLORIDE PRN MG: 1 INJECTION, SOLUTION INTRAMUSCULAR; INTRAVENOUS; SUBCUTANEOUS at 17:34

## 2023-03-22 RX ADMIN — ASPIRIN 325 MG ORAL TABLET SCH MG: 325 PILL ORAL at 20:27

## 2023-03-22 RX ADMIN — ASPIRIN 325 MG ORAL TABLET SCH MG: 325 PILL ORAL at 08:05

## 2023-03-22 RX ADMIN — HYDROMORPHONE HYDROCHLORIDE PRN MG: 1 INJECTION, SOLUTION INTRAMUSCULAR; INTRAVENOUS; SUBCUTANEOUS at 04:23

## 2023-03-22 RX ADMIN — HYDROCODONE BITARTRATE AND ACETAMINOPHEN PRN EACH: 7.5; 325 TABLET ORAL at 20:27

## 2023-03-22 NOTE — P.PN
Progress Note - Text


Progress Note Date: 03/22/23





- Chief Complaint


Right hip surgery








Hospital course


This is a pleasant 52-year-old patient who follows with Dr. Narayan Wade.  

Chronic stable medical conditions include osteoarthritis, anxiety, depression, 

insomnia, obesity.


Patient is undergoing right total hip arthroplasty.  Most procedure pain is 

controlled.  No nausea vomiting.  Eating lunch.  No chest or shortness of 

breath.  Denies any cardiac history.  Resting in bed.  Patient's son and 

daughter the bedside.


03/22/2023: Up in a recliner.  Did walk with therapy.  Some pain.  Did tolerate 

her dried.  No chest pain or shortness of breath.  Comfortable.





Active Medications





Hydrocodone Bitart/Acetaminophen (Hydrocodone/Apap 7.5-325mg 1 Each Tab)  1 each

PO Q6H PRN


   PRN Reason: Pain Scale 1 to 5


   Stop: 04/20/23 08:41


Hydrocodone Bitart/Acetaminophen (Hydrocodone/Apap 7.5-325mg 1 Each Tab)  2 each

PO Q6H PRN


   PRN Reason: Pain Scale 6 to 10


   Stop: 04/20/23 08:41


   Last Admin: 03/22/23 13:03 Dose:  2 each


   


Alprazolam (Alprazolam 0.25 Mg Tab)  0.25 mg PO BID PRN


   PRN Reason: Anxiety


Aspirin (Aspirin 325 Mg Tab)  325 mg PO BID [facility]


   Stop: 04/20/23 21:01


   Last Admin: 03/22/23 08:05 Dose:  325 mg


   


Escitalopram Oxalate (Escitalopram 10 Mg Tab)  10 mg PO DAILY [facility]


   Last Admin: 03/22/23 08:05 Dose:  10 mg


   


Hydromorphone HCl (Hydromorphone 0.5 Mg/0.5 Ml Syringe)  0.5 mg IVP Q3HR PRN


   PRN Reason: Pain Scale 4 to 6


   Stop: 04/20/23 08:39


   Last Admin: 03/22/23 09:43 Dose:  0.5 mg


   


Hydromorphone HCl (Hydromorphone 1 Mg/Ml 1 Ml Syringe)  1 mg IVP Q3HR PRN


   PRN Reason: Pain Scale 7 to 10


   Stop: 04/20/23 08:39


   Last Admin: 03/22/23 04:23 Dose:  1 mg


   


Hydromorphone HCl (Hydromorphone 0.5 Mg/0.5 Ml Syringe)  0.25 mg IVP Q3HR PRN


   PRN Reason: Pain Scale 1 to 3


   Stop: 04/20/23 08:39


Lactated Ringer's (Lactated Ringers)  1,000 mls @ 20 mls/hr IV .Q24H [facility]


   Stop: 04/19/23 19:46


   Last Admin: 03/21/23 21:36 Dose:  Not Given


   


Sodium Chloride (Saline 0.9%)  1,000 mls @ 70 mls/hr IV .[id number] [facility]


   Stop: 04/20/23 08:46


   Last Admin: 03/21/23 23:26 Dose:  70 mls/hr


   


Magnesium Hydroxide (Magnesium Hydroxide 2,400 Mg/10 Ml Cup)  2,400 mg PO DAILY 

PRN


   PRN Reason: Constipation


   Stop: 04/20/23 08:39


Naloxone HCl (Naloxone 0.4 Mg/Ml 1 Ml Vial)  0.2 mg IV Q2M PRN


   PRN Reason: Opioid Reversal


   Stop: 04/20/23 08:39


Ondansetron HCl (Ondansetron 4 Mg/2 Ml Vial)  4 mg IVP Q8H PRN


   PRN Reason: Nausea And Vomiting


   Stop: 04/20/23 08:39


Senna/Docusate Sodium (Sennosides-Docusate Sodium 1 Each Tab)  2 each PO HS [facility]


   Stop: 04/20/23 21:01


   Last Admin: 03/21/23 20:06 Dose:  2 each


   











Past medical history to include:


Osteoarthritis, anxiety, depression, obesity insomnia





Social history:


.  Does not smoke or drink alcohol





Physical examination:


VITAL SIGNS: 98.7, 62, 18, 106 by city 6, 95% room air


GENERAL: BMI 40.5, up in a recliner, comfortable


EYES: Pupils equal.  Conjunctiva normal.


HEENT: External appearance of nose and ears normal, oral cavity grossly normal.


NECK: JVD not raised; masses not palpable.


HEART: First and second heart sounds are normal;  no edema.  


LUNGS: Respiratory rate normal; clear to auscultation.  


ABDOMEN: Soft,  nontender, liver spleen not palpable, no masses palpable.  


PSYCH: Alert and oriented x3;  mood  and affect normal.  


MUSCULOSKELETAL:No Clubbing/cyanosis;muscles-grossly intact.  OA.  Dressing over

the right hip








INVESTIGATIONS, reviewed in the clinical context:


March 22: White count 10.5 hemoglobin 8.3 platelets 238


03/19/2023:


White count 6.6 hemoglobin 11.7 platelets 179 potassium 4.1 creatinine 0.6





Assessment and plan:





-Right total hip arthroplasty


Pain control.  Aspirin for DT prophylaxis.





-Acute postprocedure blood loss anemia expected from surgery


IV Ferrlecit 2





-Morbid Obesity BMI 40.5


Weight loss measures





-Primary osteoarthritis


Pain medications as needed





-Anxiety not otherwise specified


Xanax 0.25 mg twice a day when necessary





-Depression


Lexapro 10 mg a day





Discussed.  IV Ferrlecit.





Thank you Dr. Hatfield

## 2023-03-22 NOTE — P.PN
Subjective


Progress Note Date: 03/22/23


Principal diagnosis: 


Primary osteoarthritis right hip.


S/P total right hip arthroplasty with anterior approach.





This is a 52-year-old female who is postop day #1 status post total right hip 

arthroplasty.  She is stable from an orthopedic standpoint.  She's had no 

nausea, vomiting or diarrhea.  She denies fever or chills.  Vital signs are 

stable.








Objective





- Vital Signs


Vital signs: 


                                   Vital Signs











Temp  98.6 F   03/22/23 02:00


 


Pulse  88   03/22/23 02:00


 


Resp  16   03/22/23 02:00


 


BP  121/74   03/22/23 02:00


 


Pulse Ox  96   03/22/23 02:00


 


FiO2      








                                 Intake & Output











 03/21/23 03/22/23 03/22/23





 18:59 06:59 18:59


 


Intake Total 750  


 


Output Total 900  


 


Balance -150  


 


Weight 100.5 kg  


 


Intake:   


 


    


 


Output:   


 


  Urine 200  


 


  Estimated Blood Loss 700  


 


Other:   


 


  Voiding Method  Toilet 


 


  # Voids 0 2 














- Exam


This is a pleasant 52-year-old female in no acute distress.  She is alert and 

oriented 3.  Exam of the right hip reveals that her dressing is clean, dry and 

intact.  She has full foot and ankle motion without difficulty or pain.  

Neurovascular status to the lower extremity is intact.








Assessment and Plan


(1) Primary localized osteoarthritis of right hip


Current Visit: Yes   Status: Acute   Code(s): M16.11 - UNILATERAL PRIMARY 

OSTEOARTHRITIS, RIGHT HIP   SNOMED Code(s): 257519559993832


   





(2) Status post total hip replacement, right


Current Visit: Yes   Status: Acute   Code(s): Z96.641 - PRESENCE OF RIGHT 

ARTIFICIAL HIP JOINT   SNOMED Code(s): 105795587294


   


Plan: 


The clinical findings are discussed with the patient.  She is doing well but has

apprehension about going home today.  We will work with physical therapy today 

and plan discharged to home tomorrow.

## 2023-03-23 VITALS
DIASTOLIC BLOOD PRESSURE: 66 MMHG | HEART RATE: 74 BPM | SYSTOLIC BLOOD PRESSURE: 101 MMHG | TEMPERATURE: 98.7 F | RESPIRATION RATE: 18 BRPM

## 2023-03-23 RX ADMIN — HYDROCODONE BITARTRATE AND ACETAMINOPHEN PRN EACH: 7.5; 325 TABLET ORAL at 07:39

## 2023-03-23 RX ADMIN — HYDROCODONE BITARTRATE AND ACETAMINOPHEN PRN EACH: 7.5; 325 TABLET ORAL at 02:00

## 2023-03-23 RX ADMIN — CEFAZOLIN SCH: 330 INJECTION, POWDER, FOR SOLUTION INTRAMUSCULAR; INTRAVENOUS at 07:36

## 2023-03-23 RX ADMIN — HYDROCODONE BITARTRATE AND ACETAMINOPHEN PRN EACH: 7.5; 325 TABLET ORAL at 13:02

## 2023-03-23 RX ADMIN — ESCITALOPRAM OXALATE SCH MG: 10 TABLET, FILM COATED ORAL at 07:40

## 2023-03-23 RX ADMIN — ASPIRIN 325 MG ORAL TABLET SCH MG: 325 PILL ORAL at 07:39

## 2023-03-23 RX ADMIN — CEFAZOLIN SCH: 330 INJECTION, POWDER, FOR SOLUTION INTRAMUSCULAR; INTRAVENOUS at 03:25

## 2023-03-23 RX ADMIN — SODIUM FERRIC GLUCONATE COMPLEX SCH MLS/HR: 12.5 INJECTION INTRAVENOUS at 10:54

## 2023-03-23 RX ADMIN — POTASSIUM CHLORIDE SCH: 14.9 INJECTION, SOLUTION INTRAVENOUS at 07:36

## 2023-03-23 NOTE — P.DS
Providers


Date of admission: 


03/22/23 12:37





Expected date of discharge: 03/23/23


Attending physician: 


Sam Hatfield





Consults: 





                                        





03/21/23 08:38


Consult Physician Routine 


   Consulting Provider: Blaine Hargrove


   Consult Reason/Comments: medical management


   Do you want consulting provider notified?: Yes











Primary care physician: 


Narayan Wade








- Discharge Diagnosis(es)


(1) Primary localized osteoarthritis of right hip


Current Visit: Yes   Status: Acute   





(2) Status post total hip replacement, right


Current Visit: Yes   Status: Acute   


Hospital Course: 


This is a 52-year-old female with known history of degenerative arthritis of the

right hip.  The patient presented for evaluation as an outpatient.  After 

discussion and consideration patient elects to proceed with total hip 

arthroplasty.  The patient is seen preoperatively by Dr. Hatfield and medically 

cleared for surgery by their primary care physician.





Patient is [facility] to [facility]21/[facility] for total hip 

arthroplasty.  The procedure is performed without complication or sequelae.  The

patient is doing well postoperatively.  Labs and vital signs are stable on day 

of discharge.





On day of discharge patient's hip incision is healing well.  There is minimal 

erythema.  There is no drainage noted at this time.  There is minimal soft 

tissue swelling to the hip and thigh.  Patient has full foot and ankle motion 

without difficulty or pain.  Calf is soft and nontender to palpation.  

Neurovascular status to the right lower extremity is intact.  Patient is 

discharged home in good condition.  Please see med rec for accurate list of home

medications.








Plan - Discharge Summary


Discharge Rx Participant: Yes


New Discharge Prescriptions: 


New


   Aspirin 325 mg PO BID #60 tab


   HYDROcodone/APAP 7.5-325MG [Norco 7.5-325] 1 - 2 tab PO Q6H PRN #32 tab


     PRN Reason: Pain


   Sennosides [Senokot] 2 tab PO DAILY PRN #60 tablet


     PRN Reason: Constipation





Continue


   Escitalopram Oxalate [Lexapro] 10 mg PO DAILY


   ALPRAZolam [Xanax] 0.25 mg PO BID PRN


     PRN Reason: Anxiety





Discontinued


   HYDROcodone/APAP 7.5-325MG [Norco 7.5-325] 1 tab PO Q8H PRN


     PRN Reason: Pain


Discharge Medication List





ALPRAZolam [Xanax] 0.25 mg PO BID PRN 11/16/22 [History]


Escitalopram Oxalate [Lexapro] 10 mg PO DAILY 11/16/22 [History]


Aspirin 325 mg PO BID #60 tab 03/21/23 [Rx]


HYDROcodone/APAP 7.5-325MG [Norco 7.5-325] 1 - 2 tab PO Q6H PRN #32 tab 03/21/23

[Rx]


Sennosides [Senokot] 2 tab PO DAILY PRN #60 tablet 03/21/23 [Rx]








Follow up Appointment(s)/Referral(s): 


Narayan Wade MD [Primary Care Provider] - 1 Week


[facility] [NON-STAFF] - 1-2 Days ([facility] will call 

you to schedule your in home physical therapy visits. )


Sam Hatfield DO [Doctor of Osteopathic Medicine] - 03/31/23 10:15 am (With 

Carol)


Activity/Diet/Wound Care/Special Instructions: 


Weightbearing as tolerated with walker.


Leave dressing intact.  Dressing may be removed by home care nurse or by patient

in 7 days. Then change dressing twice daily until follow up.


May shower with initial dressing intact and after removal.


If dressing become saturated, please remove.


Please take aspirin 325mg twice daily for 30 days to prevent blood clots.


Recommend use of compression stockings daily until follow up to help prevent 

swelling and blood clots. May remove at night before sleeping. 


Please follow-up with [facility] in 2 weeks and call with any 

questions or concerns, [phone].





Discharge Disposition: HOME WITH HOME HEALTH SERVICES

## 2023-03-23 NOTE — P.PN
Progress Note - Text


Progress Note Date: 03/23/23





- Chief Complaint


Right hip surgery








Hospital course


This is a pleasant 52-year-old patient who follows with Dr. Narayan Wade.  

Chronic stable medical conditions include osteoarthritis, anxiety, depression, 

insomnia, obesity.


Patient is undergoing right total hip arthroplasty.  Most procedure pain is 

controlled.  No nausea vomiting.  Eating lunch.  No chest or shortness of 

breath.  Denies any cardiac history.  Resting in bed.  Patient's son and 

daughter the bedside.


03/22/2023: Up in a recliner.  Did walk with therapy.  Some pain.  Did tolerate 

her dried.  No chest pain or shortness of breath.  Comfortable.


03/23/2023: Doing well.  Pain much better controlled.  Tolerating diet.  No new 

issues.  She received IV iron.  He'll be going home today.  Follow-up with PCP.








Past medical history to include:


Osteoarthritis, anxiety, depression, obesity insomnia





Social history:


.  Does not smoke or drink alcohol





Physical examination:


VITAL SIGNS: 98.7, 74, 18, 101/66, 97% room air


GENERAL: , up in a recliner, comfortable


EYES: Pupils equal.  Conjunctiva normal.


HEENT: External appearance of nose and ears normal, oral cavity grossly normal.


NECK: JVD not raised; masses not palpable.


HEART: First and second heart sounds are normal;  no edema.  


LUNGS: Respiratory rate normal; clear to auscultation.  


ABDOMEN: Soft,  nontender, liver spleen not palpable, no masses palpable.  


PSYCH: Alert and oriented x3;  mood  and affect normal.  


MUSCULOSKELETAL:No Clubbing/cyanosis;muscles-grossly intact.  OA.  Dressing over

the right hip








INVESTIGATIONS, reviewed in the clinical context:


March 22: White count 10.5 hemoglobin 8.3 platelets 238


03/19/2023:


White count 6.6 hemoglobin 11.7 platelets 179 potassium 4.1 creatinine 0.6





Assessment and plan:





-Right total hip arthroplasty


Pain control.  Aspirin for DT prophylaxis.





-Acute postprocedure blood loss anemia expected from surgery


IV Ferrlecit 2





-Morbid Obesity BMI 40.5


Weight loss measures





-Primary osteoarthritis


Pain medications as needed





-Anxiety not otherwise specified


Xanax 0.25 mg twice a day when necessary





-Depression


Lexapro 10 mg a day





Stable.  Follow-up with PCP from discharge





Thank you Dr. Hatfield